# Patient Record
Sex: MALE | Race: WHITE | NOT HISPANIC OR LATINO | URBAN - METROPOLITAN AREA
[De-identification: names, ages, dates, MRNs, and addresses within clinical notes are randomized per-mention and may not be internally consistent; named-entity substitution may affect disease eponyms.]

---

## 2018-04-25 PROBLEM — 16761005 ESOPHAGITIS: Status: ACTIVE | Noted: 2018-04-25

## 2018-04-25 PROBLEM — 4556007 GASTRITIS: Status: ACTIVE | Noted: 2018-04-25

## 2018-04-25 PROBLEM — 428283002 HISTORY OF POLYP OF COLON: Status: ACTIVE | Noted: 2018-04-25

## 2018-04-25 PROBLEM — 72007001 DUODENITIS: Status: ACTIVE | Noted: 2018-04-25

## 2018-04-25 PROBLEM — 51868009 DUODENAL ULCER DISEASE: Status: ACTIVE | Noted: 2018-04-25

## 2018-04-25 PROBLEM — 235595009 GASTROESOPHAGEAL REFLUX DISEASE: Status: ACTIVE | Noted: 2018-04-25

## 2018-04-25 PROBLEM — 70153002 HEMORRHOIDS: Status: ACTIVE | Noted: 2018-04-25

## 2018-04-27 PROBLEM — 13644009 HYPERCHOLESTEROLEMIA: Status: ACTIVE | Noted: 2018-04-27

## 2020-11-14 ENCOUNTER — OUT OF OFFICE VISIT (OUTPATIENT)
Dept: URBAN - METROPOLITAN AREA MEDICAL CENTER 1 | Facility: MEDICAL CENTER | Age: 65
End: 2020-11-14
Payer: COMMERCIAL

## 2020-11-14 DIAGNOSIS — R10.13 ABDOMINAL DISCOMFORT, EPIGASTRIC: ICD-10-CM

## 2020-11-14 DIAGNOSIS — R74.01 ELEVATED ALANINE AMINOTRANSFERASE (ALT) LEVEL: ICD-10-CM

## 2020-11-14 DIAGNOSIS — K21.00 ALKALINE REFLUX ESOPHAGITIS: ICD-10-CM

## 2020-11-14 DIAGNOSIS — K29.60 ADENOPAPILLOMATOSIS GASTRICA: ICD-10-CM

## 2020-11-14 PROCEDURE — 43239 EGD BIOPSY SINGLE/MULTIPLE: CPT | Performed by: INTERNAL MEDICINE

## 2020-11-14 PROCEDURE — 99204 OFFICE O/P NEW MOD 45 MIN: CPT | Performed by: INTERNAL MEDICINE

## 2020-11-16 ENCOUNTER — TELEPHONE ENCOUNTER (OUTPATIENT)
Dept: URBAN - NONMETROPOLITAN AREA CLINIC 2 | Facility: CLINIC | Age: 65
End: 2020-11-16

## 2020-11-18 ENCOUNTER — OFFICE VISIT (OUTPATIENT)
Dept: URBAN - NONMETROPOLITAN AREA CLINIC 2 | Facility: CLINIC | Age: 65
End: 2020-11-18
Payer: COMMERCIAL

## 2020-11-18 VITALS
SYSTOLIC BLOOD PRESSURE: 160 MMHG | BODY MASS INDEX: 27.09 KG/M2 | DIASTOLIC BLOOD PRESSURE: 90 MMHG | TEMPERATURE: 96.3 F | WEIGHT: 200 LBS | HEIGHT: 72 IN | HEART RATE: 105 BPM

## 2020-11-18 DIAGNOSIS — R74.8 ELEVATED LIVER ENZYMES: ICD-10-CM

## 2020-11-18 DIAGNOSIS — K21.9 GERD WITHOUT ESOPHAGITIS: ICD-10-CM

## 2020-11-18 PROCEDURE — G8427 DOCREV CUR MEDS BY ELIG CLIN: HCPCS | Performed by: NURSE PRACTITIONER

## 2020-11-18 PROCEDURE — 99213 OFFICE O/P EST LOW 20 MIN: CPT | Performed by: NURSE PRACTITIONER

## 2020-11-18 PROCEDURE — 3017F COLORECTAL CA SCREEN DOC REV: CPT | Performed by: NURSE PRACTITIONER

## 2020-11-18 PROCEDURE — G8420 CALC BMI NORM PARAMETERS: HCPCS | Performed by: NURSE PRACTITIONER

## 2020-11-18 NOTE — HPI-TODAY'S VISIT:
Mr. Konrad Ascencio is a 65 year old male here for hospital f/u of elevated liver tests and upper abdominal pain. He was admitted last week to [facility] due to severe upper abdominal pain. His labs showed , ALT >500, and bili 1.7. CT scan showed fatty liver no bile duct abnormalities. He had been taking a fair amount of NSAIDs and has a hx of hiatal hernia. He had been taking nyquil and herbal supplements as well. His statin was also changed recently to zetia. His chronic work up was negative for CMV, viral hepatitis A,B,C,and AMA. He had an EGD that showed mild gastritis and mild esophagitis. Path negative for hpylori or Barretts. Today, his upper abdominal pain has resolved. He is now having lower back pain since stopping his ibuprofen. He is seeing his primary care later today and his back doctor in the next week. RAPHAEL

## 2020-11-19 ENCOUNTER — TELEPHONE ENCOUNTER (OUTPATIENT)
Dept: URBAN - METROPOLITAN AREA CLINIC 92 | Facility: CLINIC | Age: 65
End: 2020-11-19

## 2020-11-19 LAB
A/G RATIO: 1.8
ALBUMIN: 4.5
ALKALINE PHOSPHATASE: 125
ALT (SGPT): 281
AST (SGOT): 52
BASO (ABSOLUTE): 0
BASOS: 0
BILIRUBIN, TOTAL: 0.4
BUN/CREATININE RATIO: 13
BUN: 12
CALCIUM: 9.8
CARBON DIOXIDE, TOTAL: 23
CHLORIDE: 101
CREATININE: 0.96
EGFR IF AFRICN AM: 96
EGFR IF NONAFRICN AM: 83
EOS (ABSOLUTE): 0.1
EOS: 2
GLOBULIN, TOTAL: 2.5
GLUCOSE: 197
HEMATOCRIT: 46.2
HEMATOLOGY COMMENTS:: (no result)
HEMOGLOBIN: 15.1
IMMATURE CELLS: (no result)
IMMATURE GRANS (ABS): 0
IMMATURE GRANULOCYTES: 0
LYMPHS (ABSOLUTE): 1.3
LYMPHS: 19
MCH: 29
MCHC: 32.7
MCV: 89
MONOCYTES(ABSOLUTE): 0.4
MONOCYTES: 5
NEUTROPHILS (ABSOLUTE): 4.8
NEUTROPHILS: 74
NRBC: (no result)
PLATELETS: 278
POTASSIUM: 4
PROTEIN, TOTAL: 7
RBC: 5.2
RDW: 13.1
SODIUM: 139
WBC: 6.6

## 2020-12-01 ENCOUNTER — TELEPHONE ENCOUNTER (OUTPATIENT)
Dept: URBAN - NONMETROPOLITAN AREA CLINIC 2 | Facility: CLINIC | Age: 65
End: 2020-12-01

## 2020-12-01 RX ORDER — DICYCLOMINE HYDROCHLORIDE 10 MG/1
1 TABLET AS NEEDED FOR CRAMPING CAPSULE ORAL THREE TIMES A DAY
Qty: 90 TABLET | Refills: 6 | OUTPATIENT
Start: 2020-12-01 | End: 2021-06-29

## 2020-12-04 ENCOUNTER — TELEPHONE ENCOUNTER (OUTPATIENT)
Dept: URBAN - METROPOLITAN AREA CLINIC 92 | Facility: CLINIC | Age: 65
End: 2020-12-04

## 2020-12-06 ENCOUNTER — OUT OF OFFICE VISIT (OUTPATIENT)
Dept: URBAN - METROPOLITAN AREA MEDICAL CENTER 1 | Facility: MEDICAL CENTER | Age: 65
End: 2020-12-06
Payer: COMMERCIAL

## 2020-12-06 DIAGNOSIS — R93.2 ABN FIND-BILIARY TRACT: ICD-10-CM

## 2020-12-06 DIAGNOSIS — U07.1 2019 NOVEL CORONAVIRUS DETECTED: ICD-10-CM

## 2020-12-06 DIAGNOSIS — R74.8 ABNORMAL ALKALINE PHOSPHATASE TEST: ICD-10-CM

## 2020-12-06 DIAGNOSIS — K76.0 FATTY (CHANGE OF) LIVER, NOT ELSEWHERE CLASSIFIED: ICD-10-CM

## 2020-12-06 PROCEDURE — 99254 IP/OBS CNSLTJ NEW/EST MOD 60: CPT | Performed by: INTERNAL MEDICINE

## 2020-12-07 ENCOUNTER — OUT OF OFFICE VISIT (OUTPATIENT)
Dept: URBAN - METROPOLITAN AREA MEDICAL CENTER 1 | Facility: MEDICAL CENTER | Age: 65
End: 2020-12-07
Payer: COMMERCIAL

## 2020-12-07 DIAGNOSIS — K76.0 FATTY (CHANGE OF) LIVER, NOT ELSEWHERE CLASSIFIED: ICD-10-CM

## 2020-12-07 DIAGNOSIS — U07.1 2019 NOVEL CORONAVIRUS DETECTED: ICD-10-CM

## 2020-12-07 DIAGNOSIS — R93.2 ABN FIND-BILIARY TRACT: ICD-10-CM

## 2020-12-07 DIAGNOSIS — R74.8 ABNORMAL ALKALINE PHOSPHATASE TEST: ICD-10-CM

## 2020-12-07 PROCEDURE — 99231 SBSQ HOSP IP/OBS SF/LOW 25: CPT | Performed by: INTERNAL MEDICINE

## 2020-12-16 ENCOUNTER — OFFICE VISIT (OUTPATIENT)
Dept: URBAN - NONMETROPOLITAN AREA CLINIC 2 | Facility: CLINIC | Age: 65
End: 2020-12-16

## 2021-01-14 ENCOUNTER — OFFICE VISIT (OUTPATIENT)
Dept: URBAN - NONMETROPOLITAN AREA CLINIC 2 | Facility: CLINIC | Age: 66
End: 2021-01-14
Payer: COMMERCIAL

## 2021-01-14 DIAGNOSIS — R74.8 ELEVATED LIVER ENZYMES: ICD-10-CM

## 2021-01-14 DIAGNOSIS — K21.9 GERD WITHOUT ESOPHAGITIS: ICD-10-CM

## 2021-01-14 PROCEDURE — G8420 CALC BMI NORM PARAMETERS: HCPCS | Performed by: NURSE PRACTITIONER

## 2021-01-14 PROCEDURE — 3017F COLORECTAL CA SCREEN DOC REV: CPT | Performed by: NURSE PRACTITIONER

## 2021-01-14 PROCEDURE — 99213 OFFICE O/P EST LOW 20 MIN: CPT | Performed by: NURSE PRACTITIONER

## 2021-01-14 PROCEDURE — G8427 DOCREV CUR MEDS BY ELIG CLIN: HCPCS | Performed by: NURSE PRACTITIONER

## 2021-01-14 RX ORDER — DICYCLOMINE HYDROCHLORIDE 10 MG/1
1 TABLET AS NEEDED FOR CRAMPING CAPSULE ORAL THREE TIMES A DAY
Qty: 90 TABLET | Refills: 6 | Status: ACTIVE | COMMUNITY
Start: 2020-12-01 | End: 2021-06-29

## 2021-01-14 NOTE — HPI-TODAY'S VISIT:
1/14/2021 Mr. Konrad Ascencio is here for f/u of elevated liver tests and upper abdominal pain. He was admitted the first of November due to severe upper abdominal pain. His labs showed , ALT >500, and bili 1.7. CT scan showed fatty liver no bile duct abnormalities. His chronic work up was negative and his pain improved. Repeat labs showed mild improvement. All his medications were stopped to see if this was contributing. A few weeks later, his pain returned. He saw his PCP and his liver tests were again very elevated. She called our office and advised to go back to the ER. Imaging showed cholecystitis. He was positive for Covid so his CCY was put on hold. 12/28- He had a CCY with negative IOC. His liver tests were normal. He denies any further pain and wondering if he can get back on his statin. CS

## 2021-01-14 NOTE — HPI-OTHER HISTORIES
11/18/2020 Mr. Konrad Ascencio is a 65 year old male here for hospital f/u of elevated liver tests and upper abdominal pain. He was admitted last week to [facility] due to severe upper abdominal pain. His labs showed , ALT >500, and bili 1.7. CT scan showed fatty liver no bile duct abnormalities. He had been taking a fair amount of NSAIDs and has a hx of hiatal hernia. He had been taking nyquil and herbal supplements as well. His statin was also changed recently to zetia. His chronic work up was negative for CMV, viral hepatitis A,B,C,and AMA. He had an EGD that showed mild gastritis and mild esophagitis. Path negative for hpylori or Barretts. Today, his upper abdominal pain has resolved. He is now having lower back pain since stopping his ibuprofen. He is seeing his primary care later today and his back doctor in the next week. RAPHAEL

## 2021-01-15 LAB
A/G RATIO: 1.9
ALBUMIN: 4.3
ALKALINE PHOSPHATASE: 96
ALT (SGPT): 33
AST (SGOT): 20
BASO (ABSOLUTE): 0
BASOS: 0
BILIRUBIN, TOTAL: 0.2
BUN/CREATININE RATIO: 10
BUN: 8
CALCIUM: 9.3
CARBON DIOXIDE, TOTAL: 25
CHLORIDE: 102
CREATININE: 0.79
EGFR IF AFRICN AM: 110
EGFR IF NONAFRICN AM: 95
EOS (ABSOLUTE): 0.1
EOS: 2
GLOBULIN, TOTAL: 2.3
GLUCOSE: 72
HEMATOCRIT: 42.7
HEMATOLOGY COMMENTS:: (no result)
HEMOGLOBIN: 14.3
IMMATURE CELLS: (no result)
IMMATURE GRANS (ABS): 0
IMMATURE GRANULOCYTES: 1
LYMPHS (ABSOLUTE): 2.3
LYMPHS: 31
MCH: 29.4
MCHC: 33.5
MCV: 88
MONOCYTES(ABSOLUTE): 0.5
MONOCYTES: 6
NEUTROPHILS (ABSOLUTE): 4.6
NEUTROPHILS: 60
NRBC: (no result)
PLATELETS: 318
POTASSIUM: 4.3
PROTEIN, TOTAL: 6.6
RBC: 4.87
RDW: 13.4
SODIUM: 141
WBC: 7.6

## 2021-08-28 ENCOUNTER — TELEPHONE ENCOUNTER (OUTPATIENT)
Dept: URBAN - METROPOLITAN AREA CLINIC 13 | Facility: CLINIC | Age: 66
End: 2021-08-28

## 2021-08-28 RX ORDER — IPRATROPIUM BROMIDE AND ALBUTEROL SULFATE 2.5; .5 MG/3ML; MG/3ML
SOLUTION RESPIRATORY (INHALATION)
OUTPATIENT
End: 2018-04-27

## 2021-08-29 ENCOUNTER — TELEPHONE ENCOUNTER (OUTPATIENT)
Dept: URBAN - METROPOLITAN AREA CLINIC 13 | Facility: CLINIC | Age: 66
End: 2021-08-29

## 2021-08-29 RX ORDER — ASPIRIN 81 MG/1
TABLET, COATED ORAL
Status: ACTIVE | COMMUNITY

## 2021-08-29 RX ORDER — ROSUVASTATIN CALCIUM 10 MG/1
TABLET, FILM COATED ORAL
Status: ACTIVE | COMMUNITY

## 2021-08-29 RX ORDER — OMEPRAZOLE 40 MG/1
CAPSULE, DELAYED RELEASE ORAL
Status: ACTIVE | COMMUNITY

## 2021-08-29 RX ORDER — CYCLOBENZAPRINE HYDROCHLORIDE 10 MG/1
TABLET, FILM COATED ORAL
Status: ACTIVE | COMMUNITY

## 2021-08-29 RX ORDER — MELATONIN/PYRIDOXINE HCL (B6) 5 MG-10 MG
TABLET,IMMED, EXTENDED RELEASE, BIPHASIC ORAL
Status: ACTIVE | COMMUNITY

## 2021-08-29 RX ORDER — MELOXICAM 15 MG/1
TABLET ORAL
Status: ACTIVE | COMMUNITY

## 2021-08-29 RX ORDER — FLUTICASONE PROPIONATE 50 UG/1
SPRAY, METERED NASAL
Status: ACTIVE | COMMUNITY

## 2021-09-15 ENCOUNTER — WEB ENCOUNTER (OUTPATIENT)
Dept: URBAN - NONMETROPOLITAN AREA CLINIC 2 | Facility: CLINIC | Age: 66
End: 2021-09-15

## 2021-09-15 ENCOUNTER — DASHBOARD ENCOUNTERS (OUTPATIENT)
Age: 66
End: 2021-09-15

## 2021-09-15 ENCOUNTER — OFFICE VISIT (OUTPATIENT)
Dept: URBAN - NONMETROPOLITAN AREA CLINIC 2 | Facility: CLINIC | Age: 66
End: 2021-09-15
Payer: COMMERCIAL

## 2021-09-15 VITALS
BODY MASS INDEX: 28.17 KG/M2 | HEART RATE: 73 BPM | SYSTOLIC BLOOD PRESSURE: 157 MMHG | WEIGHT: 208 LBS | HEIGHT: 72 IN | TEMPERATURE: 97.6 F | DIASTOLIC BLOOD PRESSURE: 83 MMHG

## 2021-09-15 DIAGNOSIS — R74.8 ELEVATED LIVER ENZYMES: ICD-10-CM

## 2021-09-15 DIAGNOSIS — Z12.11 COLON CANCER SCREENING: ICD-10-CM

## 2021-09-15 DIAGNOSIS — K64.0 GRADE I HEMORRHOIDS: ICD-10-CM

## 2021-09-15 DIAGNOSIS — K21.9 GERD WITHOUT ESOPHAGITIS: ICD-10-CM

## 2021-09-15 PROBLEM — 266435005: Status: ACTIVE | Noted: 2020-11-18

## 2021-09-15 PROCEDURE — 99213 OFFICE O/P EST LOW 20 MIN: CPT | Performed by: INTERNAL MEDICINE

## 2021-09-15 RX ORDER — ASPIRIN 81 MG/1
TABLET, COATED ORAL
COMMUNITY

## 2021-09-15 RX ORDER — MELOXICAM 15 MG/1
TABLET ORAL
COMMUNITY

## 2021-09-15 RX ORDER — FLUTICASONE PROPIONATE 50 UG/1
SPRAY, METERED NASAL
COMMUNITY

## 2021-09-15 RX ORDER — OMEPRAZOLE 40 MG/1
CAPSULE, DELAYED RELEASE ORAL
COMMUNITY

## 2021-09-15 RX ORDER — ROSUVASTATIN CALCIUM 10 MG/1
TABLET, FILM COATED ORAL
COMMUNITY

## 2021-09-15 RX ORDER — HYDROCORTISONE ACETATE AND PRAMOXINE HYDROCHLORIDE 25; 10 MG/G; MG/G
1 APPLICATION CREAM TOPICAL THREE TIMES A DAY
Qty: 1 TUBE | Refills: 3 | OUTPATIENT
Start: 2021-09-15 | End: 2021-11-09

## 2021-09-15 RX ORDER — MELATONIN/PYRIDOXINE HCL (B6) 5 MG-10 MG
TABLET,IMMED, EXTENDED RELEASE, BIPHASIC ORAL
COMMUNITY

## 2021-09-15 RX ORDER — DOCUSATE SODIUM 100 MG/1
2 CAPSULES CAPSULE ORAL
Qty: 60 | Refills: 6 | OUTPATIENT

## 2021-09-15 RX ORDER — CYCLOBENZAPRINE HYDROCHLORIDE 10 MG/1
TABLET, FILM COATED ORAL
COMMUNITY

## 2021-09-15 NOTE — PHYSICAL EXAM GASTROINTESTINAL
Abdomen , soft, nontender, nondistended , no guarding or rigidity , no masses palpable , normal bowel sounds , Liver and Spleen , no hepatosplenomegaly , liver nontender , spleen not palpable. Rectal exam- small healing external hemorrhoid

## 2021-09-15 NOTE — HPI-TODAY'S VISIT:
9/15/2021 Mr. Konrad Ascencio is here for f/u of fatty liver and new issue of hemorrhoids.  He was admitted the first of November due to severe upper abdominal pain. His labs showed , ALT >500, and bili 1.7. CT scan showed fatty liver no bile duct abnormalities. His chronic work up was negative. He had his GB removed and all his pain resolved and his liver tests returned to normal. He had labs on 9/9 with his PCP these are not yet resulted.  He has had an external hemorrhoid after straining for the last 6 weeks. He has been using prepH and it has helped but it just took a long time. He denies any bleeding. His last colonoscopy was 2018 with internal hemorrhoids. CS

## 2023-04-24 NOTE — PHYSICAL EXAM CONSTITUTIONAL:
well developed, well nourished , in no acute distress , ambulating without difficulty , normal communication ability 24-Apr-2023

## 2024-05-30 ENCOUNTER — TELEPHONE ENCOUNTER (OUTPATIENT)
Dept: URBAN - NONMETROPOLITAN AREA CLINIC 2 | Facility: CLINIC | Age: 69
End: 2024-05-30

## 2024-05-30 RX ORDER — OMEPRAZOLE 40 MG/1
1 CAPSULE 30 MINUTES BEFORE MORNING MEAL CAPSULE, DELAYED RELEASE ORAL ONCE A DAY
Qty: 90 | Refills: 0

## 2024-07-16 ENCOUNTER — LAB OUTSIDE AN ENCOUNTER (OUTPATIENT)
Dept: URBAN - NONMETROPOLITAN AREA CLINIC 13 | Facility: CLINIC | Age: 69
End: 2024-07-16

## 2024-07-16 ENCOUNTER — OFFICE VISIT (OUTPATIENT)
Dept: URBAN - NONMETROPOLITAN AREA CLINIC 13 | Facility: CLINIC | Age: 69
End: 2024-07-16
Payer: MEDICARE

## 2024-07-16 VITALS
SYSTOLIC BLOOD PRESSURE: 132 MMHG | BODY MASS INDEX: 28.17 KG/M2 | HEIGHT: 72 IN | HEART RATE: 65 BPM | WEIGHT: 208 LBS | DIASTOLIC BLOOD PRESSURE: 80 MMHG

## 2024-07-16 DIAGNOSIS — R09.A2 GLOBUS SENSATION: ICD-10-CM

## 2024-07-16 DIAGNOSIS — K64.0 GRADE I HEMORRHOIDS: ICD-10-CM

## 2024-07-16 DIAGNOSIS — R74.8 ELEVATED LIVER ENZYMES: ICD-10-CM

## 2024-07-16 DIAGNOSIS — K21.9 GERD WITHOUT ESOPHAGITIS: ICD-10-CM

## 2024-07-16 PROCEDURE — 99214 OFFICE O/P EST MOD 30 MIN: CPT | Performed by: NURSE PRACTITIONER

## 2024-07-16 RX ORDER — FLUTICASONE PROPIONATE 50 UG/1
SPRAY, METERED NASAL
COMMUNITY

## 2024-07-16 RX ORDER — CYCLOBENZAPRINE HYDROCHLORIDE 10 MG/1
TABLET, FILM COATED ORAL
COMMUNITY

## 2024-07-16 RX ORDER — PANTOPRAZOLE SODIUM 40 MG/1
1 TABLET BEFORE BREAKFAST AND EVENING MEAL TABLET, DELAYED RELEASE ORAL TWICE DAILY
Qty: 60 | Refills: 5 | OUTPATIENT
Start: 2024-07-16

## 2024-07-16 RX ORDER — FAMOTIDINE 40 MG/1
1 TABLET AT BEDTIME TABLET, FILM COATED ORAL ONCE A DAY
Qty: 90 TABLET | Refills: 3 | OUTPATIENT
Start: 2024-07-16

## 2024-07-16 RX ORDER — MELOXICAM 15 MG/1
TABLET ORAL
COMMUNITY

## 2024-07-16 RX ORDER — OMEPRAZOLE 40 MG/1
1 CAPSULE 30 MINUTES BEFORE MORNING MEAL CAPSULE, DELAYED RELEASE ORAL ONCE A DAY
Qty: 90 | Refills: 0 | Status: ACTIVE | COMMUNITY

## 2024-07-16 RX ORDER — ROSUVASTATIN CALCIUM 10 MG/1
TABLET, FILM COATED ORAL
COMMUNITY

## 2024-07-16 RX ORDER — DOCUSATE SODIUM 100 MG/1
2 CAPSULES CAPSULE ORAL
Qty: 60 | Refills: 6 | Status: ACTIVE | COMMUNITY

## 2024-07-16 RX ORDER — ASPIRIN 81 MG/1
TABLET, COATED ORAL
COMMUNITY

## 2024-07-16 RX ORDER — MELATONIN/PYRIDOXINE HCL (B6) 5 MG-10 MG
TABLET,IMMED, EXTENDED RELEASE, BIPHASIC ORAL
COMMUNITY

## 2024-07-16 NOTE — HPI-OTHER HISTORIES
1/14/2021 Mr. Konrad Ascencio is here for f/u of elevated liver tests and upper abdominal pain. He was admitted the first of November due to severe upper abdominal pain. His labs showed , ALT >500, and bili 1.7. CT scan showed fatty liver no bile duct abnormalities. His chronic work up was negative and his pain improved. Repeat labs showed mild improvement. All his medications were stopped to see if this was contributing. A few weeks later, his pain returned. He saw his PCP and his liver tests were again very elevated. She called our office and advised to go back to the ER. Imaging showed cholecystitis. He was positive for Covid so his CCY was put on hold. 12/28- He had a CCY with negative IOC. His liver tests were normal. He denies any further pain and wondering if he can get back on his statin.    9/15/2021 Mr. Konrad Ascencio is here for f/u of fatty liver and new issue of hemorrhoids. He was admitted the first of November due to severe upper abdominal pain. His labs showed , ALT >500, and bili 1.7. CT scan showed fatty liver no bile duct abnormalities. His chronic work up was negative. He had his GB removed and all his pain resolved and his liver tests returned to normal. He had labs on 9/9 with his PCP these are not yet resulted. He has had an external hemorrhoid after straining for the last 6 weeks. He has been using prepH and it has helped but it just took a long time. He denies any bleeding. His last colonoscopy was 2018 with internal hemorrhoids.

## 2024-07-16 NOTE — HPI-TODAY'S VISIT:
Mr. Konrad Ascencio is a 68-year-old male who presents today for acid reflux and globus sensation.  He has a history of GERD.  Has been on omeprazole 40 mg for several years.  Over the last year he has noticed a globus sensation that is worse after dinner and in the morning.  He denies any overt dysphagia or regurgitation with solids, liquids, or pills throughout the day.  He saw an ENT where laryngoscopy was performed suggesting uncontrolled reflux.  Konrad thinks his indigestion and abdominal pain are well-controlled on omeprazole 40 mg.  Per our records he is overdue for colon cancer screening as well but he states this was performed at [facility] in [place] last year.  LG.

## 2024-08-20 ENCOUNTER — CLAIMS CREATED FROM THE CLAIM WINDOW (OUTPATIENT)
Dept: URBAN - NONMETROPOLITAN AREA SURGERY CENTER 1 | Facility: SURGERY CENTER | Age: 69
End: 2024-08-20
Payer: MEDICARE

## 2024-08-20 DIAGNOSIS — R09.A2 GLOBUS SENSATION: ICD-10-CM

## 2024-08-20 DIAGNOSIS — K22.89 OTHER SPECIFIED DISEASE OF ESOPHAGUS: ICD-10-CM

## 2024-08-20 PROCEDURE — 00731 ANES UPR GI NDSC PX NOS: CPT | Performed by: NURSE ANESTHETIST, CERTIFIED REGISTERED

## 2024-08-20 PROCEDURE — 43235 EGD DIAGNOSTIC BRUSH WASH: CPT | Performed by: INTERNAL MEDICINE

## 2024-08-20 PROCEDURE — 43235 EGD DIAGNOSTIC BRUSH WASH: CPT | Performed by: CLINIC/CENTER

## 2024-08-20 RX ORDER — DOCUSATE SODIUM 100 MG/1
2 CAPSULES CAPSULE ORAL
Qty: 60 | Refills: 6 | Status: ACTIVE | COMMUNITY

## 2024-08-20 RX ORDER — CYCLOBENZAPRINE HYDROCHLORIDE 10 MG/1
TABLET, FILM COATED ORAL
COMMUNITY

## 2024-08-20 RX ORDER — MELATONIN/PYRIDOXINE HCL (B6) 5 MG-10 MG
TABLET,IMMED, EXTENDED RELEASE, BIPHASIC ORAL
COMMUNITY

## 2024-08-20 RX ORDER — OMEPRAZOLE 40 MG/1
1 CAPSULE 30 MINUTES BEFORE MORNING MEAL CAPSULE, DELAYED RELEASE ORAL ONCE A DAY
Qty: 90 | Refills: 0 | Status: ACTIVE | COMMUNITY

## 2024-08-20 RX ORDER — ROSUVASTATIN CALCIUM 10 MG/1
TABLET, FILM COATED ORAL
COMMUNITY

## 2024-08-20 RX ORDER — MELOXICAM 15 MG/1
TABLET ORAL
COMMUNITY

## 2024-08-20 RX ORDER — FAMOTIDINE 40 MG/1
1 TABLET AT BEDTIME TABLET, FILM COATED ORAL ONCE A DAY
Qty: 90 TABLET | Refills: 3 | Status: ACTIVE | COMMUNITY
Start: 2024-07-16

## 2024-08-20 RX ORDER — PANTOPRAZOLE SODIUM 40 MG/1
1 TABLET BEFORE BREAKFAST AND EVENING MEAL TABLET, DELAYED RELEASE ORAL TWICE DAILY
Qty: 60 | Refills: 5 | Status: ACTIVE | COMMUNITY
Start: 2024-07-16

## 2024-08-20 RX ORDER — ASPIRIN 81 MG/1
TABLET, COATED ORAL
COMMUNITY

## 2024-08-20 RX ORDER — FLUTICASONE PROPIONATE 50 UG/1
SPRAY, METERED NASAL
COMMUNITY

## 2024-08-21 ENCOUNTER — TELEPHONE ENCOUNTER (OUTPATIENT)
Dept: URBAN - NONMETROPOLITAN AREA CLINIC 2 | Facility: CLINIC | Age: 69
End: 2024-08-21

## 2024-08-21 RX ORDER — PANTOPRAZOLE SODIUM 40 MG/1
1 TABLET BEFORE BREAKFAST AND EVENING MEAL TABLET, DELAYED RELEASE ORAL TWICE DAILY
Qty: 60 | Refills: 5
Start: 2024-07-16

## 2024-10-15 ENCOUNTER — LAB OUTSIDE AN ENCOUNTER (OUTPATIENT)
Dept: URBAN - NONMETROPOLITAN AREA CLINIC 13 | Facility: CLINIC | Age: 69
End: 2024-10-15

## 2024-10-15 ENCOUNTER — TELEPHONE ENCOUNTER (OUTPATIENT)
Dept: URBAN - NONMETROPOLITAN AREA CLINIC 2 | Facility: CLINIC | Age: 69
End: 2024-10-15

## 2024-10-15 ENCOUNTER — OFFICE VISIT (OUTPATIENT)
Dept: URBAN - NONMETROPOLITAN AREA CLINIC 13 | Facility: CLINIC | Age: 69
End: 2024-10-15
Payer: MEDICARE

## 2024-10-15 VITALS
DIASTOLIC BLOOD PRESSURE: 77 MMHG | HEIGHT: 72 IN | SYSTOLIC BLOOD PRESSURE: 126 MMHG | BODY MASS INDEX: 28.39 KG/M2 | WEIGHT: 209.6 LBS | HEART RATE: 64 BPM

## 2024-10-15 DIAGNOSIS — Z12.11 COLON CANCER SCREENING: ICD-10-CM

## 2024-10-15 DIAGNOSIS — K64.0 GRADE I HEMORRHOIDS: ICD-10-CM

## 2024-10-15 DIAGNOSIS — R09.A2 GLOBUS SENSATION: ICD-10-CM

## 2024-10-15 DIAGNOSIS — R74.8 ELEVATED LIVER ENZYMES: ICD-10-CM

## 2024-10-15 DIAGNOSIS — K21.9 GERD WITHOUT ESOPHAGITIS: ICD-10-CM

## 2024-10-15 PROCEDURE — 99214 OFFICE O/P EST MOD 30 MIN: CPT | Performed by: NURSE PRACTITIONER

## 2024-10-15 RX ORDER — DOCUSATE SODIUM 100 MG/1
2 CAPSULES CAPSULE ORAL
Qty: 60 | Refills: 6 | Status: ACTIVE | COMMUNITY

## 2024-10-15 RX ORDER — OMEPRAZOLE 40 MG/1
1 CAPSULE 30 MINUTES BEFORE MORNING MEAL CAPSULE, DELAYED RELEASE ORAL ONCE A DAY
Qty: 90 | Refills: 0 | Status: ACTIVE | COMMUNITY

## 2024-10-15 RX ORDER — CYCLOBENZAPRINE HYDROCHLORIDE 10 MG/1
TABLET, FILM COATED ORAL
COMMUNITY

## 2024-10-15 RX ORDER — VONOPRAZAN FUMARATE 26.72 MG/1
1 TABLET TABLET ORAL DAILY
Qty: 30 TABLET | Refills: 3 | OUTPATIENT
Start: 2024-10-30 | End: 2025-02-26

## 2024-10-15 RX ORDER — ROSUVASTATIN CALCIUM 10 MG/1
TABLET, FILM COATED ORAL
COMMUNITY

## 2024-10-15 RX ORDER — FLUTICASONE PROPIONATE 50 UG/1
SPRAY, METERED NASAL
COMMUNITY

## 2024-10-15 RX ORDER — FAMOTIDINE 40 MG/1
1 TABLET AT BEDTIME TABLET, FILM COATED ORAL ONCE A DAY
Qty: 90 TABLET | Refills: 3 | Status: ACTIVE | COMMUNITY
Start: 2024-07-16

## 2024-10-15 RX ORDER — FAMOTIDINE 40 MG/1
1 TABLET AT BEDTIME TABLET, FILM COATED ORAL ONCE A DAY
Qty: 90 TABLET | Refills: 3 | OUTPATIENT

## 2024-10-15 RX ORDER — MELOXICAM 15 MG/1
TABLET ORAL
COMMUNITY

## 2024-10-15 RX ORDER — MELATONIN/PYRIDOXINE HCL (B6) 5 MG-10 MG
TABLET,IMMED, EXTENDED RELEASE, BIPHASIC ORAL
COMMUNITY

## 2024-10-15 RX ORDER — ASPIRIN 81 MG/1
TABLET, COATED ORAL
COMMUNITY

## 2024-10-15 RX ORDER — PANTOPRAZOLE SODIUM 40 MG/1
1 TABLET BEFORE BREAKFAST AND EVENING MEAL TABLET, DELAYED RELEASE ORAL TWICE DAILY
Qty: 60 | Refills: 5 | Status: ACTIVE | COMMUNITY
Start: 2024-07-16

## 2024-10-15 NOTE — HPI-OTHER HISTORIES
1/14/2021 Mr. Konrad Ascencio is here for f/u of elevated liver tests and upper abdominal pain. He was admitted the first of November due to severe upper abdominal pain. His labs showed , ALT >500, and bili 1.7. CT scan showed fatty liver no bile duct abnormalities. His chronic work up was negative and his pain improved. Repeat labs showed mild improvement. All his medications were stopped to see if this was contributing. A few weeks later, his pain returned. He saw his PCP and his liver tests were again very elevated. She called our office and advised to go back to the ER. Imaging showed cholecystitis. He was positive for Covid so his CCY was put on hold. 12/28- He had a CCY with negative IOC. His liver tests were normal. He denies any further pain and wondering if he can get back on his statin. CS   9/15/2021 Mr. Konrad Ascencio is here for f/u of fatty liver and new issue of hemorrhoids. He was admitted the first of November due to severe upper abdominal pain. His labs showed , ALT >500, and bili 1.7. CT scan showed fatty liver no bile duct abnormalities. His chronic work up was negative. He had his GB removed and all his pain resolved and his liver tests returned to normal. He had labs on 9/9 with his PCP these are not yet resulted. He has had an external hemorrhoid after straining for the last 6 weeks. He has been using prepH and it has helped but it just took a long time. He denies any bleeding. His last colonoscopy was 2018 with internal hemorrhoids. CS  7/16/2024: Mr. Konrad Ascencio is a 68-year-old male who presents today for acid reflux and globus sensation. He has a history of GERD. Has been on omeprazole 40 mg for several years. Over the last year he has noticed a globus sensation that is worse after dinner and in the morning. He denies any overt dysphagia or regurgitation with solids, liquids, or pills throughout the day. He saw an ENT where laryngoscopy was performed suggesting uncontrolled reflux. Konrad thinks his indigestion and abdominal pain are well-controlled on omeprazole 40 mg. Per our records he is overdue for colon cancer screening as well but he states this was performed at [facility] in [place] last year. LG.  8/2024: EGD with dilation - benign inlet patch, no intervention advised

## 2024-10-15 NOTE — HPI-TODAY'S VISIT:
Konrad Ascencio is a 69-year-old male who returns today for follow-up of dysphagia and globus sensation.  Since his last visit he had an EGD where a benign inlet patch was noted.  No intervention was advised and a dilation was not performed.  He continues to experience the globus sensation despite changing PPI therapy to lansoprazole.  Weight has been stable.  No bleeding.  LG.

## 2024-10-30 ENCOUNTER — LAB OUTSIDE AN ENCOUNTER (OUTPATIENT)
Dept: URBAN - NONMETROPOLITAN AREA CLINIC 2 | Facility: CLINIC | Age: 69
End: 2024-10-30

## 2024-11-05 ENCOUNTER — TELEPHONE ENCOUNTER (OUTPATIENT)
Dept: URBAN - NONMETROPOLITAN AREA CLINIC 13 | Facility: CLINIC | Age: 69
End: 2024-11-05

## 2024-11-06 ENCOUNTER — ERX REFILL RESPONSE (OUTPATIENT)
Dept: URBAN - NONMETROPOLITAN AREA CLINIC 2 | Facility: CLINIC | Age: 69
End: 2024-11-06

## 2024-11-06 RX ORDER — OMEPRAZOLE 40 MG/1
1 CAPSULE 30 MINUTES BEFORE MORNING MEAL CAPSULE, DELAYED RELEASE ORAL ONCE A DAY
Qty: 90 | Refills: 3 | OUTPATIENT

## 2024-11-06 RX ORDER — OMEPRAZOLE 40 MG/1
1 CAPSULE 30 MINUTES BEFORE MORNING MEAL CAPSULE, DELAYED RELEASE ORAL ONCE A DAY
Qty: 90 | Refills: 0 | OUTPATIENT

## 2024-11-19 ENCOUNTER — TELEPHONE ENCOUNTER (OUTPATIENT)
Dept: URBAN - NONMETROPOLITAN AREA CLINIC 13 | Facility: CLINIC | Age: 69
End: 2024-11-19

## 2024-11-19 RX ORDER — DEXLANSOPRAZOLE 60 MG/1
1 CAPSULE 1/2 TO 1 HOUR BEFORE MORNING MEAL CAPSULE, DELAYED RELEASE PELLETS ORAL ONCE A DAY
Qty: 30 | OUTPATIENT
Start: 2024-11-20

## 2025-01-14 ENCOUNTER — OFFICE VISIT (OUTPATIENT)
Dept: URBAN - NONMETROPOLITAN AREA CLINIC 13 | Facility: CLINIC | Age: 70
End: 2025-01-14
Payer: MEDICARE

## 2025-01-14 VITALS
WEIGHT: 210.4 LBS | SYSTOLIC BLOOD PRESSURE: 138 MMHG | BODY MASS INDEX: 28.5 KG/M2 | HEART RATE: 66 BPM | HEIGHT: 72 IN | DIASTOLIC BLOOD PRESSURE: 84 MMHG

## 2025-01-14 DIAGNOSIS — R09.A2 GLOBUS SENSATION: ICD-10-CM

## 2025-01-14 DIAGNOSIS — K64.0 GRADE I HEMORRHOIDS: ICD-10-CM

## 2025-01-14 DIAGNOSIS — K21.9 GERD WITHOUT ESOPHAGITIS: ICD-10-CM

## 2025-01-14 DIAGNOSIS — R74.8 ELEVATED LIVER ENZYMES: ICD-10-CM

## 2025-01-14 DIAGNOSIS — Z12.11 COLON CANCER SCREENING: ICD-10-CM

## 2025-01-14 PROCEDURE — 99214 OFFICE O/P EST MOD 30 MIN: CPT | Performed by: NURSE PRACTITIONER

## 2025-01-14 RX ORDER — OMEPRAZOLE 40 MG/1
1 CAPSULE 30 MINUTES BEFORE MORNING MEAL CAPSULE, DELAYED RELEASE ORAL ONCE A DAY
Qty: 90 | Refills: 3 | Status: ACTIVE | COMMUNITY

## 2025-01-14 RX ORDER — VONOPRAZAN FUMARATE 26.72 MG/1
1 TABLET TABLET ORAL DAILY
Qty: 30 TABLET | Refills: 3 | Status: ACTIVE | COMMUNITY
Start: 2024-10-30 | End: 2025-02-26

## 2025-01-14 RX ORDER — CYCLOBENZAPRINE HYDROCHLORIDE 10 MG/1
TABLET, FILM COATED ORAL
COMMUNITY

## 2025-01-14 RX ORDER — FAMOTIDINE 40 MG/1
1 TABLET AT BEDTIME TABLET, FILM COATED ORAL ONCE A DAY
Qty: 90 TABLET | Refills: 3 | Status: ACTIVE | COMMUNITY

## 2025-01-14 RX ORDER — FLUTICASONE PROPIONATE 50 UG/1
SPRAY, METERED NASAL
COMMUNITY

## 2025-01-14 RX ORDER — PANTOPRAZOLE SODIUM 40 MG/1
1 TABLET BEFORE BREAKFAST AND EVENING MEAL TABLET, DELAYED RELEASE ORAL TWICE DAILY
Qty: 60 | Refills: 5 | Status: ACTIVE | COMMUNITY
Start: 2024-07-16

## 2025-01-14 RX ORDER — MELATONIN/PYRIDOXINE HCL (B6) 5 MG-10 MG
TABLET,IMMED, EXTENDED RELEASE, BIPHASIC ORAL
COMMUNITY

## 2025-01-14 RX ORDER — DEXLANSOPRAZOLE 60 MG/1
1 CAPSULE 1/2 TO 1 HOUR BEFORE MORNING MEAL CAPSULE, DELAYED RELEASE PELLETS ORAL ONCE A DAY
Qty: 30 | Status: ACTIVE | COMMUNITY
Start: 2024-11-20

## 2025-01-14 RX ORDER — FAMOTIDINE 40 MG/1
1 TABLET AFTER BREAKFAST AND AT BEDTIME TABLET, FILM COATED ORAL TWICE A DAY
Qty: 180 TABLET | Refills: 3 | OUTPATIENT

## 2025-01-14 RX ORDER — ASPIRIN 81 MG/1
TABLET, COATED ORAL
COMMUNITY

## 2025-01-14 RX ORDER — DOCUSATE SODIUM 100 MG/1
2 CAPSULES CAPSULE ORAL
Qty: 60 | Refills: 6 | Status: ACTIVE | COMMUNITY

## 2025-01-14 RX ORDER — MELOXICAM 15 MG/1
TABLET ORAL
COMMUNITY

## 2025-01-14 RX ORDER — ROSUVASTATIN CALCIUM 10 MG/1
TABLET, FILM COATED ORAL
COMMUNITY

## 2025-01-14 NOTE — HPI-OTHER HISTORIES
1/14/2021 Mr. Konrad Ascencio is here for f/u of elevated liver tests and upper abdominal pain. He was admitted the first of November due to severe upper abdominal pain. His labs showed , ALT >500, and bili 1.7. CT scan showed fatty liver no bile duct abnormalities. His chronic work up was negative and his pain improved. Repeat labs showed mild improvement. All his medications were stopped to see if this was contributing. A few weeks later, his pain returned. He saw his PCP and his liver tests were again very elevated. She called our office and advised to go back to the ER. Imaging showed cholecystitis. He was positive for Covid so his CCY was put on hold. 12/28- He had a CCY with negative IOC. His liver tests were normal. He denies any further pain and wondering if he can get back on his statin. CS   9/15/2021 Mr. Konrad Ascencio is here for f/u of fatty liver and new issue of hemorrhoids. He was admitted the first of November due to severe upper abdominal pain. His labs showed , ALT >500, and bili 1.7. CT scan showed fatty liver no bile duct abnormalities. His chronic work up was negative. He had his GB removed and all his pain resolved and his liver tests returned to normal. He had labs on 9/9 with his PCP these are not yet resulted. He has had an external hemorrhoid after straining for the last 6 weeks. He has been using prepH and it has helped but it just took a long time. He denies any bleeding. His last colonoscopy was 2018 with internal hemorrhoids. CS  7/16/2024: Mr. Konrad Ascencio is a 68-year-old male who presents today for acid reflux and globus sensation. He has a history of GERD. Has been on omeprazole 40 mg for several years. Over the last year he has noticed a globus sensation that is worse after dinner and in the morning. He denies any overt dysphagia or regurgitation with solids, liquids, or pills throughout the day. He saw an ENT where laryngoscopy was performed suggesting uncontrolled reflux. Konrad thinks his indigestion and abdominal pain are well-controlled on omeprazole 40 mg. Per our records he is overdue for colon cancer screening as well but he states this was performed at [facility] in [place] last year. LG.  8/2024: EGD with dilation - benign inlet patch, no intervention advised  10/2024: Konrad Ascencio is a 69-year-old male who returns today for follow-up of dysphagia and globus sensation. Since his last visit he had an EGD where a benign inlet patch was noted. No intervention was advised and a dilation was not performed. He continues to experience the globus sensation despite changing PPI therapy to lansoprazole. Weight has been stable. No bleeding. LG.  MBS with moderate pharyngeal residue - recommendation to perform chin tuck and double swallow to clear liquids

## 2025-01-14 NOTE — HPI-TODAY'S VISIT:
Konrad returns for f/u of globus sensation. He continues to experience sensation at cervical esophagus, noting a constant "ball" at the back of his throat, that only improves when laying down. Worst in AM and before bed.  He has not tried chin tuck when swallowing. He does think famotidine may be helping decrease sputum in the morning. Voquezna was too costly for patient and did not offer much benefit. His weight is stable and he denies any bleeding. Frustrated by his persistent symptoms. LG.

## 2025-02-05 ENCOUNTER — TELEPHONE ENCOUNTER (OUTPATIENT)
Dept: URBAN - NONMETROPOLITAN AREA CLINIC 2 | Facility: CLINIC | Age: 70
End: 2025-02-05

## 2025-07-15 ENCOUNTER — TELEPHONE ENCOUNTER (OUTPATIENT)
Dept: URBAN - NONMETROPOLITAN AREA CLINIC 2 | Facility: CLINIC | Age: 70
End: 2025-07-15

## 2025-07-15 ENCOUNTER — OFFICE VISIT (OUTPATIENT)
Dept: URBAN - NONMETROPOLITAN AREA CLINIC 13 | Facility: CLINIC | Age: 70
End: 2025-07-15
Payer: MEDICARE

## 2025-07-15 DIAGNOSIS — R09.A2 GLOBUS SENSATION: ICD-10-CM

## 2025-07-15 DIAGNOSIS — R74.8 ELEVATED LIVER ENZYMES: ICD-10-CM

## 2025-07-15 DIAGNOSIS — K21.9 GERD WITHOUT ESOPHAGITIS: ICD-10-CM

## 2025-07-15 DIAGNOSIS — K64.0 GRADE I HEMORRHOIDS: ICD-10-CM

## 2025-07-15 DIAGNOSIS — Z12.11 COLON CANCER SCREENING: ICD-10-CM

## 2025-07-15 PROCEDURE — 99214 OFFICE O/P EST MOD 30 MIN: CPT | Performed by: NURSE PRACTITIONER

## 2025-07-15 RX ORDER — MELATONIN/PYRIDOXINE HCL (B6) 5 MG-10 MG
TABLET,IMMED, EXTENDED RELEASE, BIPHASIC ORAL
Status: ACTIVE | COMMUNITY

## 2025-07-15 RX ORDER — ASPIRIN 81 MG/1
TABLET, COATED ORAL
Status: ACTIVE | COMMUNITY

## 2025-07-15 RX ORDER — PANTOPRAZOLE SODIUM 40 MG/1
1 TABLET BEFORE BREAKFAST AND EVENING MEAL TABLET, DELAYED RELEASE ORAL TWICE DAILY
Qty: 60 | Refills: 5 | Status: ON HOLD | COMMUNITY
Start: 2024-07-16

## 2025-07-15 RX ORDER — FLUTICASONE PROPIONATE 50 UG/1
SPRAY, METERED NASAL
Status: ACTIVE | COMMUNITY

## 2025-07-15 RX ORDER — DOCUSATE SODIUM 100 MG/1
2 CAPSULES CAPSULE ORAL
Qty: 60 | Refills: 6 | Status: ACTIVE | COMMUNITY

## 2025-07-15 RX ORDER — DEXLANSOPRAZOLE 60 MG/1
1 CAPSULE 1/2 TO 1 HOUR BEFORE MORNING MEAL CAPSULE, DELAYED RELEASE PELLETS ORAL ONCE A DAY
Qty: 30 | Status: ON HOLD | COMMUNITY
Start: 2024-11-20

## 2025-07-15 RX ORDER — OMEPRAZOLE 40 MG/1
1 CAPSULE 30 MINUTES BEFORE MORNING MEAL CAPSULE, DELAYED RELEASE ORAL ONCE A DAY
Qty: 90 | Refills: 3 | Status: ON HOLD | COMMUNITY

## 2025-07-15 RX ORDER — FAMOTIDINE 40 MG/1
1 TABLET AFTER BREAKFAST AND AT BEDTIME TABLET, FILM COATED ORAL TWICE A DAY
Qty: 180 TABLET | Refills: 3 | Status: ACTIVE | COMMUNITY

## 2025-07-15 RX ORDER — MELOXICAM 15 MG/1
TABLET ORAL
Status: ACTIVE | COMMUNITY

## 2025-07-15 RX ORDER — CYCLOBENZAPRINE HYDROCHLORIDE 10 MG/1
TABLET, FILM COATED ORAL
Status: ACTIVE | COMMUNITY

## 2025-07-15 RX ORDER — ROSUVASTATIN CALCIUM 10 MG/1
TABLET, FILM COATED ORAL
Status: ACTIVE | COMMUNITY

## 2025-07-15 NOTE — HPI-OTHER HISTORIES
1/14/2021 Mr. Konrad Ascencio is here for f/u of elevated liver tests and upper abdominal pain. He was admitted the first of November due to severe upper abdominal pain. His labs showed , ALT >500, and bili 1.7. CT scan showed fatty liver no bile duct abnormalities. His chronic work up was negative and his pain improved. Repeat labs showed mild improvement. All his medications were stopped to see if this was contributing. A few weeks later, his pain returned. He saw his PCP and his liver tests were again very elevated. She called our office and advised to go back to the ER. Imaging showed cholecystitis. He was positive for Covid so his CCY was put on hold. 12/28- He had a CCY with negative IOC. His liver tests were normal. He denies any further pain and wondering if he can get back on his statin. CS   9/15/2021 Mr. Konrad Ascencio is here for f/u of fatty liver and new issue of hemorrhoids. He was admitted the first of November due to severe upper abdominal pain. His labs showed , ALT >500, and bili 1.7. CT scan showed fatty liver no bile duct abnormalities. His chronic work up was negative. He had his GB removed and all his pain resolved and his liver tests returned to normal. He had labs on 9/9 with his PCP these are not yet resulted. He has had an external hemorrhoid after straining for the last 6 weeks. He has been using prepH and it has helped but it just took a long time. He denies any bleeding. His last colonoscopy was 2018 with internal hemorrhoids. CS  7/16/2024: Mr. Konrad Ascencio is a 68-year-old male who presents today for acid reflux and globus sensation. He has a history of GERD. Has been on omeprazole 40 mg for several years. Over the last year he has noticed a globus sensation that is worse after dinner and in the morning. He denies any overt dysphagia or regurgitation with solids, liquids, or pills throughout the day. He saw an ENT where laryngoscopy was performed suggesting uncontrolled reflux. Konrad thinks his indigestion and abdominal pain are well-controlled on omeprazole 40 mg. Per our records he is overdue for colon cancer screening as well but he states this was performed at [facility] in [place] last year. LG.  8/2024: EGD with dilation - benign inlet patch, no intervention advised  10/2024: Konrad Ascencio is a 69-year-old male who returns today for follow-up of dysphagia and globus sensation. Since his last visit he had an EGD where a benign inlet patch was noted. No intervention was advised and a dilation was not performed. He continues to experience the globus sensation despite changing PPI therapy to lansoprazole. Weight has been stable. No bleeding. LG.  MBS with moderate pharyngeal residue - recommendation to perform chin tuck and double swallow to clear liquids  1/15/2025:  Konrad returns for f/u of globus sensation. He continues to experience sensation at cervical esophagus, noting a constant "ball" at the back of his throat, that only improves when laying down. Worst in AM and before bed. He has not tried chin tuck when swallowing. He does think famotidine may be helping decrease sputum in the morning. Voquezna was too costly for patient and did not offer much benefit. His weight is stable and he denies any bleeding. Frustrated by his persistent symptoms. LG.

## 2025-07-15 NOTE — HPI-TODAY'S VISIT:
Patient presents for follow-up of globus sensation.  Imodium is not been helpful.  His primary complaint today is phlegm that he has most often in the mornings and in the evenings.  He is planning to see ENT on Friday to discuss this with them.  He denies any bleeding.  His weight is stable.  He declines repeat EGD at this time but is interested in a repeat trial of Voquezna. No other GI questions or concerns. LG.

## 2025-08-07 ENCOUNTER — TELEPHONE ENCOUNTER (OUTPATIENT)
Dept: URBAN - NONMETROPOLITAN AREA CLINIC 2 | Facility: CLINIC | Age: 70
End: 2025-08-07

## 2025-08-07 RX ORDER — VONOPRAZAN FUMARATE 26.72 MG/1
1 TABLET TABLET ORAL ONCE A DAY
Qty: 90 TABLET | Refills: 3 | OUTPATIENT
Start: 2025-08-07 | End: 2026-08-02

## 2025-08-12 ENCOUNTER — OFFICE VISIT (OUTPATIENT)
Dept: URBAN - NONMETROPOLITAN AREA CLINIC 13 | Facility: CLINIC | Age: 70
End: 2025-08-12